# Patient Record
Sex: MALE
[De-identification: names, ages, dates, MRNs, and addresses within clinical notes are randomized per-mention and may not be internally consistent; named-entity substitution may affect disease eponyms.]

---

## 2022-12-15 ENCOUNTER — HOSPITAL ENCOUNTER (OUTPATIENT)
Dept: HOSPITAL 46 - ED | Age: 27
Discharge: HOME | End: 2022-12-15
Attending: SURGERY
Payer: COMMERCIAL

## 2022-12-15 VITALS — BODY MASS INDEX: 23.46 KG/M2 | WEIGHT: 145.99 LBS | HEIGHT: 66 IN

## 2022-12-15 DIAGNOSIS — T18.198A: Primary | ICD-10-CM

## 2022-12-15 DIAGNOSIS — T18.3XXA: ICD-10-CM

## 2022-12-15 PROCEDURE — 0DC98ZZ EXTIRPATION OF MATTER FROM DUODENUM, VIA NATURAL OR ARTIFICIAL OPENING ENDOSCOPIC: ICD-10-PCS | Performed by: SURGERY

## 2022-12-15 PROCEDURE — 0DC58ZZ EXTIRPATION OF MATTER FROM ESOPHAGUS, VIA NATURAL OR ARTIFICIAL OPENING ENDOSCOPIC: ICD-10-PCS | Performed by: SURGERY

## 2022-12-15 PROCEDURE — U0003 INFECTIOUS AGENT DETECTION BY NUCLEIC ACID (DNA OR RNA); SEVERE ACUTE RESPIRATORY SYNDROME CORONAVIRUS 2 (SARS-COV-2) (CORONAVIRUS DISEASE [COVID-19]), AMPLIFIED PROBE TECHNIQUE, MAKING USE OF HIGH THROUGHPUT TECHNOLOGIES AS DESCRIBED BY CMS-2020-01-R: HCPCS

## 2022-12-15 PROCEDURE — C9803 HOPD COVID-19 SPEC COLLECT: HCPCS

## 2022-12-15 NOTE — NUR
12/15/22 2048 Sheets,Nasrin
2029 PT ARRIVED TO PACU WITH ORAL AIRWAY IN PLACE, JAW THRUST USE TO
MAINTAIN AIRWAY OFF AND ON.
 
2033 PT HEAD TO SIDE AND ORAL AIRWAY REMOVED DUE TO PT SWALLOWING.
RESP EVEN AND UNLABORED.
 
2037 PT COUGHING AND SUCTION USED, CLEAR SPUTUM NOTED. PT ABLE TO
SWALLOW AND CLEAR AIRWAY. HOB INCREASED.
 
2042 PT WAKES TO TACTILE BUT DOES NOT ANSWER QUESTIONS.
 
2044 PT REPORTS DIZZINESS AND PLAN OF CARE DISCUSSED.
 
2047 PT REPORTS PAIN IN THROAT, EDUCATION GIVEN.

## 2022-12-15 NOTE — NUR
PATIENT RESTING IN FETAL POSITION, DRINKING WATER AND TOLERATING JELLO.
PATIENT REPORTS PAIN WELL CONTROLLED. BREATHING EASY AND REGULAR, 100% ON RA.
PLAN TO DISCHARGE AT 2200.  IN ROOM WITH PATIENT. PLAN DC BACK TO long term.

## 2022-12-15 NOTE — NUR
PATIENT UP TO BATHROOM VOIDED CLEAR URINE. REPORTS PAIN WELL CONTROLLED.
TOLERATING JELLO AND FLUIDS. PATIENT DRESSED TO RETURN TO care home WITH 
AND OTHER SUPPORT STAFF. PROVIDED DISCHARGE INSTRUCTION, ANSWERED QUESTIONS
AND CONCERNS. PATIENT TRANSPORTED OUT IN WHEELCHAIR, NURSE PUSHED PATIENT TO
TRANSPORT VAN.

## 2022-12-16 NOTE — OR
[facility]
                                    [address], [place]  [address]
_________________________________________________________________________________________
                                                                 Signed   
 
 
DATE OF OPERATION:
12/15/2022
 
SURGEON:
Laura Landon MD
 
PREOPERATIVE DIAGNOSIS:
Ingested foreign bodies x2 (portion of plastic comb and ballpoint pen).
 
POSTOPERATIVE DIAGNOSIS:
1. Impacted portion of plastic comb, mid esophagus at 30 cm (extracted).
2. Ballpoint pen in 2nd and 1st part of duodenum (extracted).
 
PROCEDURE:
1. Upper endoscopy and extraction of esophageal foreign body (three-prong grasper
technique). 
2. Removal of foreign body of duodenum (cold snare).
 
ANESTHESIA:
General endotracheal, Azael Matias CRNA.
 
INDICATION:
This 27-year-old  man is 48 hours since swallowing two foreign bodies, one a
plastic comb and the other a ballpoint pen.  He presents to the emergency room having
some complaints of upper abdominal and substernal pain, but no fever, chills, or other
problem.  Evaluation by Dr. Tesfaye in the emergency room confirmed on plain x-ray a
ballpoint pen which appeared to be in the stomach, but no visualization of plastic comb
in any part of the GI tract including esophagus, stomach, duodenum, and upper GI tract.
A CT scan was performed which showed the pen in the stomach, but did not visualize any
comb in the thoracic esophagus.  All of the esophagus was not visualized, it is
admitted.  He has agreed to undergo upper endoscopy by general anesthesia, anticipating
extraction of one or more foreign bodies.  He understands the risks of bleeding,
infection, perforation, other unforeseen complications and wished to proceed. 
 
FINDINGS:
Indeed there was a portion of a black plastic comb in the mid esophagus at about 30 cm.
This was extracted with a three-prong grasper ultimately. 
 
The stomach itself showed no sign of retained foreign body, but in the bulbar and 2nd
portions of the duodenum was the ballpoint pen.  It was unable to easily be extracted
initially, but ultimately was accomplished with a snare approach.  The procedure was
rather prolonged, complicated, difficult in that regard. 
 
    Electronically Signed By: LAURA LANDON MD  12/16/22 1703
_________________________________________________________________________________________
PATIENT NAME:     YASHIRA KUMARI                     
MEDICAL RECORD #: [id number]            OPERATIVE REPORT              
          ACCT #: [id number]  
DATE OF BIRTH:   [birth year]            REPORT #: [id number]      
PHYSICIAN:        LAURA LANDON MD                 
PCP:              NO PRIMARY CARE PHYSICIAN     
REPORT IS CONFIDENTIAL AND NOT TO BE RELEASED WITHOUT AUTHORIZATION
 
 
                                  [facility]
                                    [address], [place]  [address]
_________________________________________________________________________________________
                                                                 Signed   
 
 
 
DESCRIPTION OF PROCEDURE:
The patient brought to the endoscopy suite in the supine position, given a general
endotracheal anesthetic.  A bite block was placed. 
 
An Olympus video upper endoscope was passed in the hypopharynx and easily into the
esophagus.  In the mid esophagus at approximately 30 cm, the foreign body of a plastic
comb was identified.  Initially, an attempt to [place] the proximal comb with a snare was
unsuccessful.  A three-prong grasper was used to grab the upper portion of the comb and
it was easily extracted and withdrawn from the oropharynx ultimately. 
 
The scope was reintroduced in the esophagus and passed down to the stomach.  The stomach
was insufflated with air.  There was no evidence of ballpoint pen at that point. 
 
Passage to the pylorus was undertaken and the junction between the bulbar and 2nd
portions was the ballpoint pen.  It was of an appearance that it seemed unlikely to pass
through the 2nd portion of the duodenum. 
 
Initial manipulations were undertaken with the snare and a three-prong grasper and so
on, but no particular success was made in actually grasping the firm but rather slippery
foreign body.  With various manipulations, the snare was ultimately manipulated into
place grasping the upper one eighth of the pen and manipulating it more proximally into
the duodenal bulb.  The pen would not withdraw through the pylorus despite various
maneuvers to do so and the snare was allowed to relax and ultimately placed to the very
end of the pin ultimately allowing withdrawal of the pin into the stomach itself.  The
scope was then withdrawn with all due care extracting the ballpoint pen as well. 
 
The pen was offloaded and scope reintroduced.  Examination of the esophagus, stomach and
duodenum showed no sign of serious injury.  There was irritation and inflammation and
some minor abrasions of the mucosa of the duodenum and the stomach, but no sign of
perforation or other problem.  The patient was taken to recovery room in good condition
having suffered no known complications. 
 
CONCLUSION DIAGNOSIS:
Foreign body of esophagus and of duodenum, now extracted.
 
PLAN:
Recommend avoidance of opportunity for ingestion of additional foreign bodies.  He will
maintain a mechanical soft diet for 48 hours.  He will be able to return to the [facility]  this evening... he is advised to maintain a  mechanical soft diet for 48
hrs.
 
 
    Electronically Signed By: LAURA LANDON MD  12/16/22 7631
_________________________________________________________________________________________
PATIENT NAME:     YASHIRA KUMARI                     
MEDICAL RECORD #: [id number]            OPERATIVE REPORT              
          ACCT #: [id number]  
DATE OF BIRTH:   [birth year]            REPORT #: [id number]      
PHYSICIAN:        LAURA LANDON MD                 
PCP:              NO PRIMARY CARE PHYSICIAN     
REPORT IS CONFIDENTIAL AND NOT TO BE RELEASED WITHOUT AUTHORIZATION
 
 
                                  [address]  [address]
_________________________________________________________________________________________
                                                                 Signed   
 
 
 
 
 
            ________________________________________
            Laura Landon MD 
 
 
JM/MODL
Job #:  [id number]
DD:  12/15/2022 20:42:24
DT:  12/15/2022 23:56:31
 
cc:            [facility]  /CECELIA Bennett MD
 
 
Copies:  ALLEGRA HORTON
~
 
 
 
 
 
 
 
 
 
 
 
 
 
 
 
 
 
 
 
 
 
 
 
 
    Electronically Signed By: LAURA LANDON MD  12/16/22 1703
_________________________________________________________________________________________
PATIENT NAME:     QUOCYASHIRAHOMA BLANTON                     
MEDICAL RECORD #: [id number]            OPERATIVE REPORT              
          ACCT #: [id number]  
DATE OF BIRTH:   [birth year]            REPORT #: [id number]      
PHYSICIAN:        LAURA LANDON MD                 
PCP:              NO PRIMARY CARE PHYSICIAN     
REPORT IS CONFIDENTIAL AND NOT TO BE RELEASED WITHOUT AUTHORIZATION
No

## 2022-12-16 NOTE — CONS
[facility]
                                    [address], [place]  [address]
_________________________________________________________________________________________
                                                                 Signed   
 
 
DATE OF CONSULTATION:
12/15/2022
 
TIME:
7:00 p.m.
 
REQUESTING PHYSICIAN:
Dr. Tesfaye.
 
PROBLEM:
Ingested foreign body, stomach.
 
HISTORY OF PRESENT ILLNESS:
This 27-year-old  man is a prisoner at [facility]WAIT[facility]. 
 Yesterday, he said to have
swallowed a pen as well as a small plastic comb.  He presented to the emergency room
where he was evaluated by Dr. Tesfaye including plain abdominal and chest x-ray, which
shows no sign of abnormality other than a pen in the stomach.  A CT scan was performed,
again confirming a pen in the stomach.  As far as a comb, there is no visible such
foreign body that is seen. 
 
The patient thinks the comb may be in the esophagus.  Close inspection of the plain
x-ray as well as the CT scan shows no clear evidence of an ingested comb, however. 
 
His past medical history is notable for cleft lip repair as well as an orthopedic
surgery in the past.  He takes no medications on a routine basis. 
 
His motivation for swallowing the foreign body was "to get out of the skilled nursing for a
while." 
 
REVIEW OF SYSTEMS:
Denies any shortness of breath or actual chest pain.  Does have a sensation of mild
dysphagia.  He has not had hypersalivation. 
 
PHYSICAL EXAMINATION:
GENERAL:  Thin  man who is alert, oriented and nontoxic.  Trachea is midline.
He has no crepitus.  There is no tracheal deviation.  He has no hoarseness. 
CHEST:  Clear. 
HEART:  Regular without murmur. 
ABDOMEN:  Soft and nondistended.  There is no focal mass or tenderness. 
EXTREMITIES:  Show no clubbing, cyanosis, or edema.
 
IMAGING DATA:
I have reviewed the CT scan in detail as well as the radiologist report.  An 11 cm
 
    Electronically Signed By: LAURA LANDON MD  12/16/22 1703
_________________________________________________________________________________________
PATIENT NAME:     YASHIRA KUMARI                     
MEDICAL RECORD #: [id number]            CONSULTATION                  
          ACCT #: [id number]  
DATE OF BIRTH:   [birth year]            REPORT #: [id number]      
PHYSICIAN:        LAURA LANDON MD                 
PCP:              NO PRIMARY CARE PHYSICIAN     
REPORT IS CONFIDENTIAL AND NOT TO BE RELEASED WITHOUT AUTHORIZATION
 
 
                                  [facility]
                                    [address], [place]  [address]
_________________________________________________________________________________________
                                                                 Signed   
 
 
stomach density is consistent with swallowed pen.  There is no other abnormality noted. 
 
ASSESSMENT:
The foreign body that has been swallowed is a pen and is unlikely to pass more than 48
hours now since his ingestion.  Although it is possible the length of the pen, the angle
of the duodenum, and other anatomic factors would make it unlikely to pass.  As regards,
perforation would be rather unlikely, however. 
 
More concerning to me is the reported ingestion of a small plastic comb.  It is not
visible and may be present as well and of course may be in the esophagus as the patient
himself postulates.  He is not having severe dysphagia, hypersalivation, or anything
else to suggest obstruction of the esophagus with a foreign body.  In any case,
endoscopic evaluation and removal endoscopically would be most preferable. 
 
On occasion, endoscopic means of removal are not possible and in that situation
operation with gastrotomy to remove foreign body may be needed.  I did discuss this with
him in detail.  More likely than not an endoscopic approach alone will be successful,
but if not regrouping and revisiting the idea of open operation would have to be made. 
 
 
 
            ________________________________________
            MD IVETTE Agudelo/MODL
Job #:  [id number]
DD:  12/15/2022 18:59:25
DT:  12/15/2022 23:00:03
 
cc:            MD Allegra Phillips FNP  [facility] 
 
 
Copies:  ALLEGRA HORTON
~
 
 
 
 
 
 
 
 
    Electronically Signed By: LAURA LANDON MD  12/16/22 1703
_________________________________________________________________________________________
PATIENT NAME:     YASHIRA KUMARI                     
MEDICAL RECORD #: [id number]            CONSULTATION                  
          ACCT #: [id number]  
DATE OF BIRTH:   [birth year]            REPORT #: [id number]      
PHYSICIAN:        LAURA LANDON MD                 
PCP:              NO PRIMARY CARE PHYSICIAN     
REPORT IS CONFIDENTIAL AND NOT TO BE RELEASED WITHOUT AUTHORIZATION